# Patient Record
Sex: MALE | HISPANIC OR LATINO | Employment: FULL TIME | URBAN - METROPOLITAN AREA
[De-identification: names, ages, dates, MRNs, and addresses within clinical notes are randomized per-mention and may not be internally consistent; named-entity substitution may affect disease eponyms.]

---

## 2017-01-05 ENCOUNTER — OFFICE VISIT (OUTPATIENT)
Dept: MEDICAL GROUP | Age: 62
End: 2017-01-05
Payer: COMMERCIAL

## 2017-01-05 VITALS
OXYGEN SATURATION: 95 % | WEIGHT: 178 LBS | HEIGHT: 62 IN | TEMPERATURE: 97 F | RESPIRATION RATE: 12 BRPM | BODY MASS INDEX: 32.76 KG/M2 | DIASTOLIC BLOOD PRESSURE: 80 MMHG | SYSTOLIC BLOOD PRESSURE: 140 MMHG | HEART RATE: 82 BPM

## 2017-01-05 DIAGNOSIS — Z00.00 PREVENTATIVE HEALTH CARE: ICD-10-CM

## 2017-01-05 DIAGNOSIS — K42.9 UMBILICAL HERNIA WITHOUT OBSTRUCTION AND WITHOUT GANGRENE: ICD-10-CM

## 2017-01-05 DIAGNOSIS — E08.00 DIABETES MELLITUS DUE TO UNDERLYING CONDITION WITH HYPEROSMOLARITY WITHOUT COMA, WITHOUT LONG-TERM CURRENT USE OF INSULIN (HCC): ICD-10-CM

## 2017-01-05 DIAGNOSIS — E78.00 HYPERCHOLESTEROLEMIA: ICD-10-CM

## 2017-01-05 DIAGNOSIS — I10 ESSENTIAL HYPERTENSION: ICD-10-CM

## 2017-01-05 DIAGNOSIS — E66.9 OBESITY (BMI 30-39.9): ICD-10-CM

## 2017-01-05 PROCEDURE — 99214 OFFICE O/P EST MOD 30 MIN: CPT | Performed by: FAMILY MEDICINE

## 2017-01-05 RX ORDER — ASPIRIN 81 MG/1
81 TABLET, CHEWABLE ORAL DAILY
Qty: 100 TAB | Refills: 0 | Status: SHIPPED | OUTPATIENT
Start: 2017-01-05

## 2017-01-05 RX ORDER — LISINOPRIL 5 MG/1
5 TABLET ORAL DAILY
Qty: 30 TAB | Refills: 0 | Status: SHIPPED | OUTPATIENT
Start: 2017-01-05

## 2017-01-05 RX ORDER — ATORVASTATIN CALCIUM 20 MG/1
20 TABLET, FILM COATED ORAL DAILY
Qty: 30 TAB | Refills: 0 | Status: SHIPPED | OUTPATIENT
Start: 2017-01-05

## 2017-01-05 ASSESSMENT — PATIENT HEALTH QUESTIONNAIRE - PHQ9: CLINICAL INTERPRETATION OF PHQ2 SCORE: 0

## 2017-01-05 NOTE — ASSESSMENT & PLAN NOTE
[place] [facility]   The patient stopped taking his simvastatin as well. Again this is all because his doctor stopped working hearing and return to the clinic.

## 2017-01-05 NOTE — ASSESSMENT & PLAN NOTE
Patient states that this has been present for years but recently has become painfull. He works as a  and has lift heavy equipment in the kitchen. He denies any changes in bowel or bladder function. He is able tolerate by mouth.

## 2017-01-05 NOTE — ASSESSMENT & PLAN NOTE
The patient has a significant history of diabetes mellitus he was taking Januvia 100 as well as glucovance 5/500 bid and tollerated them fine in the past. The patient denies  any polydipsia, polyuria, polyphagia, weight loss, no numbness or tingling anywhere, no changes in vision.    Results for CANDELARIO QUIÑONEZPACHECO IBARRA (MRN [id number]) as of 1/5/2017 13:37   Ref. Range 2/8/2013 09:50 1/23/2014 11:16   Glycohemoglobin Latest Ref Range: 0.0-5.6 % 11.4 9.3 (H)   Estim. Avg Glu Latest Units: mg/dL 280 220

## 2017-01-05 NOTE — ASSESSMENT & PLAN NOTE
The patient is a 61-year-old male who presents to clinic to reestablish care. He has a significant past medical history of diabetes mellitus, hypertension and hyperlipidemia. He stopped taking all of his medications since his doctor stopped working here couple years ago. The patient denied any chest pain, no sob, no hartley, no  pnd, no orthopnea, no headache, no changes in vision, no numbness or tingling, no nausea, no diarrhea, no abdominal pain, no fevers, no chills, no bright red blood per rectum, no  difficulty urinating, no burning during micturition, no depressed mood, no other concerns.

## 2017-01-06 ENCOUNTER — HOSPITAL ENCOUNTER (OUTPATIENT)
Dept: LAB | Facility: MEDICAL CENTER | Age: 62
End: 2017-01-06
Attending: FAMILY MEDICINE
Payer: COMMERCIAL

## 2017-01-06 DIAGNOSIS — E08.00 DIABETES MELLITUS DUE TO UNDERLYING CONDITION WITH HYPEROSMOLARITY WITHOUT COMA, WITHOUT LONG-TERM CURRENT USE OF INSULIN (HCC): ICD-10-CM

## 2017-01-06 LAB
ALBUMIN SERPL BCP-MCNC: 3.9 G/DL (ref 3.2–4.9)
ALBUMIN/GLOB SERPL: 1.1 G/DL
ALP SERPL-CCNC: 90 U/L (ref 30–99)
ALT SERPL-CCNC: 13 U/L (ref 2–50)
ANION GAP SERPL CALC-SCNC: 7 MMOL/L (ref 0–11.9)
AST SERPL-CCNC: 12 U/L (ref 12–45)
BASOPHILS # BLD AUTO: 0.03 K/UL (ref 0–0.12)
BASOPHILS NFR BLD AUTO: 0.4 % (ref 0–1.8)
BILIRUB SERPL-MCNC: 0.6 MG/DL (ref 0.1–1.5)
BUN SERPL-MCNC: 14 MG/DL (ref 8–22)
CALCIUM SERPL-MCNC: 9 MG/DL (ref 8.5–10.5)
CHLORIDE SERPL-SCNC: 102 MMOL/L (ref 96–112)
CHOLEST SERPL-MCNC: 223 MG/DL (ref 100–199)
CO2 SERPL-SCNC: 25 MMOL/L (ref 20–33)
CREAT SERPL-MCNC: 0.58 MG/DL (ref 0.5–1.4)
EOSINOPHIL # BLD: 1.38 K/UL (ref 0–0.51)
EOSINOPHIL NFR BLD AUTO: 20.1 % (ref 0–6.9)
ERYTHROCYTE [DISTWIDTH] IN BLOOD BY AUTOMATED COUNT: 38.8 FL (ref 35.9–50)
EST. AVERAGE GLUCOSE BLD GHB EST-MCNC: 329 MG/DL
GLOBULIN SER CALC-MCNC: 3.5 G/DL (ref 1.9–3.5)
GLUCOSE SERPL-MCNC: 302 MG/DL (ref 65–99)
HBA1C MFR BLD: 13.1 % (ref 0–5.6)
HCT VFR BLD AUTO: 49 % (ref 42–52)
HDLC SERPL-MCNC: 45 MG/DL
HGB BLD-MCNC: 16.9 G/DL (ref 14–18)
IMM GRANULOCYTES # BLD AUTO: 0.01 K/UL (ref 0–0.11)
IMM GRANULOCYTES NFR BLD AUTO: 0.1 % (ref 0–0.9)
LDLC SERPL CALC-MCNC: 154 MG/DL
LYMPHOCYTES # BLD: 1.98 K/UL (ref 1–4.8)
LYMPHOCYTES NFR BLD AUTO: 28.9 % (ref 22–41)
MCH RBC QN AUTO: 30 PG (ref 27–33)
MCHC RBC AUTO-ENTMCNC: 34.5 G/DL (ref 33.7–35.3)
MCV RBC AUTO: 87 FL (ref 81.4–97.8)
MONOCYTES # BLD: 0.39 K/UL (ref 0–0.85)
MONOCYTES NFR BLD AUTO: 5.7 % (ref 0–13.4)
NEUTROPHILS # BLD: 3.07 K/UL (ref 1.82–7.42)
NEUTROPHILS NFR BLD AUTO: 44.8 % (ref 44–72)
NRBC # BLD AUTO: 0 K/UL
NRBC BLD-RTO: 0 /100 WBC
PLATELET # BLD AUTO: 243 K/UL (ref 164–446)
PMV BLD AUTO: 11.6 FL (ref 9–12.9)
POTASSIUM SERPL-SCNC: 4 MMOL/L (ref 3.6–5.5)
PROT SERPL-MCNC: 7.4 G/DL (ref 6–8.2)
RBC # BLD AUTO: 5.63 M/UL (ref 4.7–6.1)
SODIUM SERPL-SCNC: 134 MMOL/L (ref 135–145)
TRIGL SERPL-MCNC: 120 MG/DL (ref 0–149)
WBC # BLD AUTO: 6.9 K/UL (ref 4.8–10.8)

## 2017-01-06 PROCEDURE — 80053 COMPREHEN METABOLIC PANEL: CPT

## 2017-01-06 PROCEDURE — 83036 HEMOGLOBIN GLYCOSYLATED A1C: CPT

## 2017-01-06 PROCEDURE — 85025 COMPLETE CBC W/AUTO DIFF WBC: CPT

## 2017-01-06 PROCEDURE — 36415 COLL VENOUS BLD VENIPUNCTURE: CPT

## 2017-01-06 PROCEDURE — 80061 LIPID PANEL: CPT

## 2017-01-09 ENCOUNTER — TELEPHONE (OUTPATIENT)
Dept: MEDICAL GROUP | Age: 62
End: 2017-01-09

## 2017-01-09 NOTE — TELEPHONE ENCOUNTER
Phone Number Called: [phone] (home)     Message: Left message for pt to call back.    Left Message for patient to call back: yes

## 2017-01-09 NOTE — TELEPHONE ENCOUNTER
----- Message from Codie Bianchi M.D. sent at 1/9/2017 10:09 AM PST -----  Reza Fisher,     Your diabetes needs to be much better controlled, as we discussed.  Remember our plan for medication changes.   See you at our next visit.    Nathanael Bianchi MD  [facility]  [address] [place] [phone]

## 2017-02-09 ENCOUNTER — OFFICE VISIT (OUTPATIENT)
Dept: MEDICAL GROUP | Age: 62
End: 2017-02-09
Payer: COMMERCIAL

## 2017-02-09 ENCOUNTER — TELEPHONE (OUTPATIENT)
Dept: MEDICAL GROUP | Age: 62
End: 2017-02-09

## 2017-02-09 VITALS
HEIGHT: 62 IN | HEART RATE: 78 BPM | BODY MASS INDEX: 32.97 KG/M2 | TEMPERATURE: 97.2 F | OXYGEN SATURATION: 94 % | SYSTOLIC BLOOD PRESSURE: 122 MMHG | DIASTOLIC BLOOD PRESSURE: 80 MMHG | WEIGHT: 179.2 LBS

## 2017-02-09 DIAGNOSIS — E08.21 DIABETES MELLITUS DUE TO UNDERLYING CONDITION WITH DIABETIC NEPHROPATHY, WITHOUT LONG-TERM CURRENT USE OF INSULIN (HCC): ICD-10-CM

## 2017-02-09 DIAGNOSIS — K42.0 UMBILICAL HERNIA WITH OBSTRUCTION, WITHOUT GANGRENE: ICD-10-CM

## 2017-02-09 DIAGNOSIS — Z12.11 SCREENING FOR COLON CANCER: ICD-10-CM

## 2017-02-09 DIAGNOSIS — I10 ESSENTIAL HYPERTENSION: ICD-10-CM

## 2017-02-09 DIAGNOSIS — Z23 NEED FOR VACCINATION: ICD-10-CM

## 2017-02-09 DIAGNOSIS — E78.00 HYPERCHOLESTEROLEMIA: ICD-10-CM

## 2017-02-09 PROCEDURE — 90732 PPSV23 VACC 2 YRS+ SUBQ/IM: CPT | Performed by: FAMILY MEDICINE

## 2017-02-09 PROCEDURE — 90472 IMMUNIZATION ADMIN EACH ADD: CPT | Performed by: FAMILY MEDICINE

## 2017-02-09 PROCEDURE — 99214 OFFICE O/P EST MOD 30 MIN: CPT | Mod: 25 | Performed by: FAMILY MEDICINE

## 2017-02-09 PROCEDURE — 90715 TDAP VACCINE 7 YRS/> IM: CPT | Performed by: FAMILY MEDICINE

## 2017-02-09 PROCEDURE — 90471 IMMUNIZATION ADMIN: CPT | Performed by: FAMILY MEDICINE

## 2017-02-09 RX ORDER — ATORVASTATIN CALCIUM 20 MG/1
20 TABLET, FILM COATED ORAL
Qty: 90 TAB | Refills: 0 | Status: SHIPPED | OUTPATIENT
Start: 2017-02-09

## 2017-02-09 NOTE — ASSESSMENT & PLAN NOTE
Patient states that this has been present for years but recently has become painfull. He works as a  and has lift heavy equipment in the kitchen. He denies any changes in bowel or bladder function. He is able tolerate by mouth. He did receive a referral to surgery on last visit, however he lost the paperwork.

## 2017-02-09 NOTE — MR AVS SNAPSHOT
"        Phillip Mann   2017 10:00 AM   Office Visit   MRN: [id number]    Department:  [address] [place] [address]   Dept Phone:  [phone]    Description:  Male : 1955   Provider:  Codie Bianchi M.D.           Reason for Visit     Hypertension lab review      Allergies as of 2017     No Known Allergies      You were diagnosed with     Diabetes mellitus due to underlying condition with diabetic nephropathy, without long-term current use of insulin (CMS-HCC)   [5156759]       Hypercholesterolemia   [982368]       Essential hypertension   [1867614]       Umbilical hernia with obstruction, without gangrene   [243567]       Need for vaccination   [024324]       Screening for colon cancer   [483963]         Vital Signs     Blood Pressure Pulse Temperature Height Weight Body Mass Index    122/80 mmHg 78 36.2 °C (97.2 °F) 1.575 m (5' 2.01\") 81.285 kg (179 lb 3.2 oz) 32.77 kg/m2    Oxygen Saturation Smoking Status                94% Never Smoker           Basic Information     Date Of Birth Sex Race Ethnicity Preferred Language    1955 Male Unknown  Origin (English,Angolan,Indian,Blaine, etc) English      Your appointments     2017  9:00 AM   Established Patient with Codie Bianchi M.D.   [facility] [address] [place] [id number] [phone]           You will be receiving a confirmation call a few days before your appointment from our automated call confirmation system.              Problem List              ICD-10-CM Priority Class Noted - Resolved    DM (diabetes mellitus) (CMS-HCC) E11.9   2014 - Present    Hypercholesterolemia E78.00   2014 - Present    Essential hypertension I10   2017 - Present    Umbilical hernia K42.9   2017 - Present    Preventative health care Z00.00   2017 - Present    Obesity (BMI 30-39.9) E66.9   2017 - Present    Need for vaccination Z23   2017 - " Present    Screening for colon cancer Z12.11   2/9/2017 - Present      Health Maintenance        Date Due Completion Dates    DIABETES MONOFILAMENT / LE EXAM [birth year]/29/1955 ---    IMM DTaP/Tdap/Td Vaccine (1 - Tdap) 12/29/1973 ---    IMM PNEUMOCOCCAL 19-64 (ADULT) MEDIUM RISK SERIES (1 of 1 - PPSV23) 12/29/1973 ---    COLONOSCOPY 12/29/2004 ---    IMM ZOSTER VACCINE 12/29/2014 ---    URINE ACR / MICROALBUMIN 1/23/2015 1/23/2014, 2/8/2013, 1/13/2012    IMM INFLUENZA (1) 9/1/2016 ---    RETINAL SCREENING 5/3/2017 5/3/2016    A1C SCREENING 7/6/2017 1/6/2017, 1/23/2014, 2/8/2013, 4/6/2012    FASTING LIPID PROFILE 1/6/2018 1/6/2017, 1/23/2014, 2/8/2013, 4/6/2012, 1/13/2012    SERUM CREATININE 1/6/2018 1/6/2017, 1/23/2014, 2/8/2013, 1/13/2012            Current Immunizations     Pneumococcal polysaccharide vaccine (PPSV-23) 2/9/2017    Tdap Vaccine 2/9/2017      Below and/or attached are the medications your provider expects you to take. Review all of your home medications and newly ordered medications with your provider and/or pharmacist. Follow medication instructions as directed by your provider and/or pharmacist. Please keep your medication list with you and share with your provider. Update the information when medications are discontinued, doses are changed, or new medications (including over-the-counter products) are added; and carry medication information at all times in the event of emergency situations     Allergies:  No Known Allergies          Medications  Valid as of: February 09, 2017 - 10:45 AM    Generic Name Brand Name Tablet Size Instructions for use    Aspirin (Chew Tab) ASA 81 MG Take 1 Tab by mouth every day.        Atorvastatin Calcium (Tab) LIPITOR 20 MG Take 1 Tab by mouth every day.        Atorvastatin Calcium (Tab) LIPITOR 20 MG Take 1 Tab by mouth every bedtime.        GlyBURIDE-MetFORMIN (Tab) GLUCOVANCE 5-500 MG TAKE ONE TABLET BY MOUTH TWICE A DAY WITH MEALS        Lisinopril (Tab) PRINIVIL 5 MG  Take 1 Tab by mouth every day.        MetFORMIN HCl (Tab) GLUCOPHAGE 500 MG Take 1 Tab by mouth 2 times a day, with meals.        Misc. Devices (Misc) Misc. Devices  Please provide patient with shingles vaccine        Simvastatin (Tab) ZOCOR 20 MG TAKE ONE TABLET BY MOUTH EVERY EVENING        SITagliptin Phosphate (Tab) JANUVIA 100 MG Take 1 Tab by mouth every day.        SITagliptin-MetFORMIN HCl (Tab) JANUMET  MG Take 1 Tab by mouth 2 times a day, with meals.        Tadalafil (Tab) CIALIS 20 MG Take 1 Tab by mouth as needed for Erectile Dysfunction.        .                 Medicines prescribed today were sent to:     [facility][id number] - [place] - [address]    [address] [place] [address]    Phone: [phone] Fax: [phone]    Open 24 Hours?: No      Medication refill instructions:       If your prescription bottle indicates you have medication refills left, it is not necessary to call your provider’s office. Please contact your pharmacy and they will refill your medication.    If your prescription bottle indicates you do not have any refills left, you may request refills at any time through one of the following ways: The online [employer] car easily beat system (except Urgent Care), by calling your provider’s office, or by asking your pharmacy to contact your provider’s office with a refill request. Medication refills are processed only during regular business hours and may not be available until the next business day. Your provider may request additional information or to have a follow-up visit with you prior to refilling your medication.   *Please Note: Medication refills are assigned a new Rx number when refilled electronically. Your pharmacy may indicate that no refills were authorized even though a new prescription for the same medication is available at the pharmacy. Please request the medicine by name with the pharmacy before contacting your provider for a refill.        Referral     A referral request  has been sent to our patient care coordination department. Please allow 3-5 business days for us to process this request and contact you either by phone or mail. If you do not hear from us by the 5th business day, please call us at [phone].        Instructions    Vacuna contra la culebrilla, Lo que debe saber  (Shingles Vaccine: What You Need to Know)  ¿QUÉ ES LA CULEBRILLA?  · Es javy erupción dolorosa de la piel, en la que aparecen ampollas. Esta enfermedad también se denomina herpes zoster o zoster.  · Se trata de javy erupción que aparece en un lado del roman o del cuerpo y dura entre 2 y 4 semanas. El síntoma principal es el dolor, que puede ser bastante intenso. Otros síntomas son fiebre, cefalea, escalofríos y malestar en el estómago. En casos raros, esta infección puede causar neumonía, problemas auditivos, ceguera, inflamación cerebral (encefalitis) o la muerte.  · En 1 de cada 5 personas, el dolor intenso puede persistir aún después que la erupción desaparezca. Molly dolor se denomina neurlagia post herpética.  · La causa de la culebrilla es el virus varicela-zoster. Mloly es el mismo virus que causa la varicela. Sólo las personas que oliver sufrido varicela o que oliver recibido la vacuna contra la varicela pueden tener culebrilla. El virus permanece en el organismo. Puede volver a aparecer muchos años después para causar culebrilla.  · La culebrilla no se contagia. Sin embargo, javy persona que nunca sufrió varicela (ni recibió la vacuna) podría contraer varicela contagiándose de alguien que padece culebrilla. [place] no es frecuente.  · La culebrilla es más frecuente entre las personas mayores de 50 años que entre los más jóvenes. También es más frecuente en personas cuyo sistema inmunológico está debilitado debido a javy enfermedad reanna el cáncer o medicamentos reanna los corticoides o las drogas utilizadas en quimioterapia.  · Al menos 1 millón de personas contrae culebrilla cada año en los [place].  VACUNA CONTRA LA CULEBRILLA  · Esta vacuna fue patentada en 2006. En los ensayos clínicos, se demostró que la vacuna prevenía la culebrilla en alrededor de la mitad de las personas. También reduce el dolor asociado a cornelio trastorno.  · Se indica javy única dosis de vacuna en adultos de más de 60 años.  ALGUNAS PERSONAS NO DEBEN RECIBIR LA VACUNA O DEBEN ESPERAR  No deben vacunarse las personas que:  · Alguna vez hayan sufrido javy reacción alérgica a la gelatina, al antibiótico neomicina o a cualquier otro componente de la vacuna. Si observa javy reacción alérgica grave, comuníquese con el médico.  · Corrales sistema inmunológico está debilitado debido a:  ¨ SIDA u otra enfermedad que afecte el sistema inmunológico.  ¨ Tratamientos con drogas que afecten el sistema inmunológico gurpreet los corticoides.  ¨ Tratamientos para el cáncer gurpreet la radiación o la quimioterapia.  ¨ Tienen javy historia de cáncer que haya afectado la médula ósea o el sistema linfático, gurpreet leucemia o linfoma.  ¨ Están embarazadas o mara estarlo. Las que deseen quedar embarazadas no deben hacerlo hasta al menos 4 semanas luego de recibir esta vacuna.  Las personas con enfermedades menores gurpreet un resfrío, pueden vacunarse. Las personas que sufren enfermedades moderadas o graves deben esperar hasta corrales recuperación antes de recibir la vacuna. Aquí se incluye a quienes presenten javy temperatura de 101.3° F (38.5° C).  ¿CUÁLES SON LOS RIESGOS DE LA VACUNA CONTRA LA CULEBRILLA?  · Javy vacuna, gurpreet cualquier medicamento, puede causar problemas serios, gurpreet por ejemplo reacciones alérgicas graves. Sin embargo, el riesgo de que cualquier vacuna cause un daño grave, o la muerte, es extremadamente pequeño.  · No se oliver asociado reacciones de importancia a estas vacunas.  Problemas leves  · Enrojecimiento, dolor, hinchazón, o picazón en el lugar de la inyección (en 1 de cada 3 personas).  · Cefalea (en aproximadamente 1 de cada 70 personas).  Gurpreet todas  las vacunas, esta vacuna sigue siendo controlada para hallar problemas infrecuentes o graves.  ¿QUÉ KATIE HACER EN MAXIME DE JAVY REACCIÓN MODERADA O GRAVE?  ¿Qué katie observar?  Cualquier estado anormal reanna javy reacción alérgica grave o fiebre chris. Si ocurre javy reacción alérgica grave, ocurrirá entre unos pocos minutos hasta javy hora después de la aplicación de la inyección. Signos de reacción alérgica grave que presente dificultad para respirar, debilidad, ronquera o silbidos, ritmo cardíaco acelerado, ronchas, mareos, palidez, o hinchazón de la garganta.   ¿Qué katie hacer?  · Comuníquese con el profesional que lo asiste o lleve inmediatamente a la persona al médico.  · Cuéntele al médico lo que ha sucedido, la fecha y momento en el que ocurrió y cuándo se aplicó la vacuna.  · [place]ídale a corrales médico, enfermera o el departamento de suha que informen la reacción llenando el formulario de Vaccine Adverse Event Report (Informe de reacciones adversas a las vacunas - VAERS). O, puede enviar cornelio informe a través de la página web del [facility] al http://www.[place].hhs.gov o llamando al 5-648-249-4188.  El VAERS no proporciona orientación médica.  ¿CÓMO PUEDO SABER MÁS?  · El profesional podrá darle el prospecto de la vacuna o sugerirle otras vazquez de información.  · Comuníquese con los Centers for Disease Control and Prevention (Centros para el control y la prevención de enfermedades, CDC).  ¨ Llame al 8-981-971-8409 (2-180-KWX-INFO).  ¨ Visite los sitios web de CDC ubicados enhttp://www.cdc.gov/vaccines.  CDC Shingles Vaccine-Vietnamese VIS (10/6/09)     Esta información no tiene reanna fin reemplazar el consejo del médico. Asegúrese de hacerle al médico cualquier pregunta que tenga.     Document Released: 06/05/2009 Document Revised: 05/03/2016  Elsevier Interactive Patient Education ©2016 Elsevier Inc.            MyChart Status: Patient Declined

## 2017-02-09 NOTE — PATIENT INSTRUCTIONS
Vacuna contra la culebrilla, Lo que debe saber  (Shingles Vaccine: What You Need to Know)  ¿QUÉ ES LA CULEBRILLA?  · Es javy erupción dolorosa de la piel, en la que aparecen ampollas. Esta enfermedad también se denomina herpes zoster o zoster.  · Se trata de javy erupción que aparece en un lado del roman o del cuerpo y dura entre 2 y 4 semanas. El síntoma principal es el dolor, que puede ser bastante intenso. Otros síntomas son fiebre, cefalea, escalofríos y malestar en el estómago. En casos raros, esta infección puede causar neumonía, problemas auditivos, ceguera, inflamación cerebral (encefalitis) o la muerte.  · En 1 de cada 5 personas, el dolor intenso puede persistir aún después que la erupción desaparezca. [place] dolor se denomina neurlagia post herpética.  · La causa de la culebrilla es el virus varicela-zoster. Cornelio es el mismo virus que causa la varicela. Sólo las personas que oliver sufrido varicela o que oliver recibido la vacuna contra la varicela pueden tener culebrilla. El virus permanece en el organismo. Puede volver a aparecer muchos años después para causar culebrilla.  · La culebrilla no se contagia. Sin embargo, javy persona que nunca sufrió varicela (ni recibió la vacuna) podría contraer varicela contagiándose de alguien que padece culebrilla. [place] no es frecuente.  · La culebrilla es más frecuente entre las personas mayores de 50 años que entre los más jóvenes. También es más frecuente en personas cuyo sistema inmunológico está debilitado debido a javy enfermedad reanna el cáncer o medicamentos reanna los corticoides o las drogas utilizadas en quimioterapia.  · Al menos 1 millón de personas contrae culebrilla cada año en los [place].  VACUNA CONTRA LA CULEBRILLA  · Esta vacuna fue patentada en 2006. En los ensayos clínicos, se demostró que la vacuna prevenía la culebrilla en alrededor de la mitad de las personas. También reduce el dolor asociado a cornelio trastorno.  · Se indica javy única dosis de vacuna  en adultos de más de 60 años.  ALGUNAS PERSONAS NO DEBEN RECIBIR LA VACUNA O DEBEN ESPERAR  No deben vacunarse las personas que:  · Alguna vez hayan sufrido javy reacción alérgica a la gelatina, al antibiótico neomicina o a cualquier otro componente de la vacuna. Si observa javy reacción alérgica grave, comuníquese con el médico.  · Corrales sistema inmunológico está debilitado debido a:  ¨ SIDA u otra enfermedad que afecte el sistema inmunológico.  ¨ Tratamientos con drogas que afecten el sistema inmunológico gurpreet los corticoides.  ¨ Tratamientos para el cáncer gurpreet la radiación o la quimioterapia.  ¨ Tienen javy historia de cáncer que haya afectado la médula ósea o el sistema linfático, gurpreet leucemia o linfoma.  ¨ Están embarazadas o mara estarlo. Las que deseen quedar embarazadas no deben hacerlo hasta al menos 4 semanas luego de recibir esta vacuna.  Las personas con enfermedades menores gurpreet un resfrío, pueden vacunarse. Las personas que sufren enfermedades moderadas o graves deben esperar hasta corrales recuperación antes de recibir la vacuna. Aquí se incluye a quienes presenten javy temperatura de 101.3° F (38.5° C).  ¿CUÁLES SON LOS RIESGOS DE LA VACUNA CONTRA LA CULEBRILLA?  · Javy vacuna, gurpreet cualquier medicamento, puede causar problemas serios, gurpreet por ejemplo reacciones alérgicas graves. Sin embargo, el riesgo de que cualquier vacuna cause un daño grave, o la muerte, es extremadamente pequeño.  · No se oliver asociado reacciones de importancia a estas vacunas.  Problemas leves  · Enrojecimiento, dolor, hinchazón, o picazón en el lugar de la inyección (en 1 de cada 3 personas).  · Cefalea (en aproximadamente 1 de cada 70 personas).  Gurpreet todas las vacunas, esta vacuna sigue siendo controlada para hallar problemas infrecuentes o graves.  ¿QUÉ KATIE HACER EN MAXIME DE JAVY REACCIÓN MODERADA O GRAVE?  ¿Qué katie observar?  Cualquier estado anormal gurpreet javy reacción alérgica grave o fiebre chris. Si ocurre javy reacción alérgica  grave, ocurrirá entre unos pocos minutos hasta javy hora después de la aplicación de la inyección. Signos de reacción alérgica grave que presente dificultad para respirar, debilidad, ronquera o silbidos, ritmo cardíaco acelerado, ronchas, mareos, palidez, o hinchazón de la garganta.   ¿Qué kika hacer?  · Comuníquese con el profesional que lo asiste o lleve inmediatamente a la persona al médico.  · Cuéntele al médico lo que ha sucedido, la fecha y momento en el que ocurrió y cuándo se aplicó la vacuna.  · Pídale a corrales médico, enfermera o el departamento de suha que informen la reacción llenando el formulario de Vaccine Adverse Event Report (Informe de reacciones adversas a las vacunas - VAERS). O, puede enviar cornelio informe a través de la página web del [facility] al http://www.[employer].[place].gov o llamando al 1-812-293-1726.  El VAERS no proporciona orientación médica.  ¿CÓMO PUEDO SABER MÁS?  · El profesional podrá darle el prospecto de la vacuna o sugerirle otras vazquez de información.  · Comuníquese con los Centers for Disease Control and Prevention (Centros para el control y la prevención de enfermedades, CDC).  ¨ Llame al 0-679-632-2463 (0-163-TCP-INFO).  ¨ Visite los sitios web de CDC ubicados enhttp://www.cdc.gov/vaccines.  CDC Shingles Vaccine-[place] VIS (10/6/09)     Esta información no tiene reanna fin reemplazar el consejo del médico. Asegúrese de hacerle al médico cualquier pregunta que tenga.     Document Released: 06/05/2009 Document Revised: 05/03/2016  Elsevier Interactive Patient Education ©2016 Elsevier Inc.

## 2017-02-09 NOTE — ASSESSMENT & PLAN NOTE
We restarted patient on atorvastatin 20 mg by mouth daily at bedtime. He has been tolerating it just fine. He denies any muscle aches, no abdominal pain, no history of elevated liver enzymes.

## 2017-02-09 NOTE — ASSESSMENT & PLAN NOTE
The patient has been off of his medications for over approximately 2 years because his physician retired. On the first visit we started him on metformin 500 mg by mouth daily, he tolerated this just fine. He denied any polyuria, no polydipsia, no polyphagia, no weight loss, no numbness or tingling anywhere, no change in vision.    The patient is on an ACE inhibitor, aspirin and a statin, also checks feet meticulously daily for ulcerations.    Retinal screening was done last year, next due on 5/3/2017  microalbumin checked due now  Up to date on vaccinations NO

## 2017-02-09 NOTE — TELEPHONE ENCOUNTER
DOCUMENTATION OF PAR STATUS:    1. Name of Medication & Dose: Janumet  mg tab     2. Name of Prescription Coverage Company & phone #: [facility]     3. Date Prior Auth Submitted: 2/9/17    4. What information was given to obtain insurance decision? Diagnosis, medications tried and failed, office visit notes and recent labs    5. Prior Auth Status? Pending    6. Patient Notified: no

## 2017-02-09 NOTE — PROGRESS NOTES
This medical record contains text that has been entered with the assistance of computer voice recognition and dictation software.  Therefore, it may contain unintended errors in text, spelling, punctuation, or grammar    Chief Complaint   Patient presents with   • Hypertension     lab review       Phillip Mann is a 61 y.o. male here evaluation and management of: diabetes mellitus, HTN, HLD, umbilical hernia      HPI:     DM (diabetes mellitus)  The patient has been off of his medications for over approximately 2 years because his physician retired. On the first visit we started him on metformin 500 mg by mouth daily, he tolerated this just fine. He denied any polyuria, no polydipsia, no polyphagia, no weight loss, no numbness or tingling anywhere, no change in vision.    The patient is on an ACE inhibitor, aspirin and a statin, also checks feet meticulously daily for ulcerations.    Retinal screening was done last year, next due on 5/3/2017  microalbumin checked due now  Up to date on vaccinations NO    Essential hypertension  We started him on lisinopril 5 mg by mouth daily almost visit. Blood pressure is better controlled based on vitals today.The patient is  on a Baby ASPIRIN and a  STATIN.  The patient has been tollerating the BP meds without any issues.He is tolerating his medication well, he denies any lightheadedness, no tunnel vision, no syncopal episodes, no fatigue, no leg weakness no falls.    Hypercholesterolemia  We restarted patient on atorvastatin 20 mg by mouth daily at bedtime. He has been tolerating it just fine. He denies any muscle aches, no abdominal pain, no history of elevated liver enzymes.    Umbilical hernia  Patient states that this has been present for years but recently has become painfull. He works as a  and has lift heavy equipment in the kitchen. He denies any changes in bowel or bladder function. He is able tolerate by mouth. He did receive a referral to surgery on last  "visit, however he lost the paperwork.      Current medicines (including changes today)  Current Outpatient Prescriptions   Medication Sig Dispense Refill   • sitagliptan-metformin (JANUMET)  MG per tablet Take 1 Tab by mouth 2 times a day, with meals. 180 Tab 0   • atorvastatin (LIPITOR) 20 MG Tab Take 1 Tab by mouth every bedtime. 90 Tab 0   • Misc. Devices Misc Please provide patient with shingles vaccine 1 Application 0   • metformin (GLUCOPHAGE) 500 MG Tab Take 1 Tab by mouth 2 times a day, with meals. 60 Tab 0   • lisinopril (PRINIVIL) 5 MG Tab Take 1 Tab by mouth every day. 30 Tab 0   • atorvastatin (LIPITOR) 20 MG Tab Take 1 Tab by mouth every day. 30 Tab 0   • simvastatin (ZOCOR) 20 MG TABS TAKE ONE TABLET BY MOUTH EVERY EVENING 30 Tab 0   • glyburide-metformin (GLUCOVANCE) 5-500 MG TABS TAKE ONE TABLET BY MOUTH TWICE A DAY WITH MEALS 60 Tab 0   • aspirin (ASA) 81 MG Chew Tab chewable tablet Take 1 Tab by mouth every day. (Patient not taking: Reported on 2/9/2017) 100 Tab 0   • sitagliptin (JANUVIA) 100 MG TABS Take 1 Tab by mouth every day. (Patient not taking: Reported on 1/5/2017) 30 Tab 11   • tadalafil (CIALIS) 20 MG tablet Take 1 Tab by mouth as needed for Erectile Dysfunction. (Patient not taking: Reported on 1/5/2017) 5 Tab 6     No current facility-administered medications for this visit.     He  has no past medical history on file.  He  has no past surgical history on file.  Social History   Substance Use Topics   • Smoking status: Never Smoker    • Smokeless tobacco: Never Used   • Alcohol Use: No     Social History     Social History Narrative     History reviewed. No pertinent family history.  No family status information on file.         ROS  Please see hpi    All other systems reviewed and are negative     Objective:     Blood pressure 122/80, pulse 78, temperature 36.2 °C (97.2 °F), height 1.575 m (5' 2.01\"), weight 81.285 kg (179 lb 3.2 oz), SpO2 94 %. Body mass index is 32.77 " kg/(m^2).  Physical Exam:    Constitutional: Alert, no distress.  Skin: Warm, dry, good turgor, no rashes in visible areas.  Eye: Equal, round and reactive, conjunctiva clear, lids normal.  ENMT: Lips without lesions, good dentition, oropharynx clear.  Neck: Trachea midline, no masses, no thyromegaly. No cervical or supraclavicular lymphadenopathy.  Respiratory: Unlabored respiratory effort, lungs clear to auscultation, no wheezes, no ronchi.  Cardiovascular: Normal S1, S2, no murmur, no edema.  Abdomen: Abdomen: Soft,  Ventral hernia noted below umbilicus 2cm, TTT, easily reduced  Psych: Alert and oriented x3, normal affect and mood.      Assessment and Plan:   The following treatment plan was discussed, again this medical record contains text that has been entered with the assistance of computer voice recognition and dictation software.  Therefore, it may contain unintended errors in text, spelling, punctuation, or grammar      1. Diabetes mellitus due to underlying condition with diabetic nephropathy, without long-term current use of insulin (HCC)  Increase Janumet to 50/1000 twice a day  Continue lisinopril, continue baby aspirin, continue Lipitor    The patient was counseled on the following  1. meticulous bilateral feet inspection daily  2. yearly dilated eye exams,   3. Wt. loss of 5% will decrease insulin resistance follow a low-fat diet and to begin an exercise program  4.  Pt. is on baby aspirin  5. Patient  is on an ace inhibitor will check microalbumin yearly  6. BP goal is ,130/80, and LDL goal is <70  7. Hba1c goal is less than 7  8. Recommend cmp and A1C evaluation every 3 months  9. Check blood sugar twice daily  and minimum once daily at various times  10. Also needs diabetic education  11. Recommend vaccinations: Yearly Flu, Pneumvox, and hepatitis B vaccine  12. Yearly dental exam  13. Patient is on a Statin  - sitagliptan-metformin (JANUMET)  MG per tablet; Take 1 Tab by mouth 2 times a  day, with meals.  Dispense: 180 Tab; Refill: 0    2. Hypercholesterolemia  No adverse reactions thus far    - atorvastatin (LIPITOR) 20 MG Tab; Take 1 Tab by mouth every bedtime.  Dispense: 90 Tab; Refill: 0    3. Essential hypertension  Blood pressure is better controlled and at goal since starting lisinopril  He has not had any adverse events, no intolerable side effects    4. Umbilical hernia with obstruction, without gangrene  Since it is painful we will  Ask for surgical correction  - REFERRAL TO GENERAL SURGERY    5. Need for vaccination  Given today    - PNEUMOCOCCAL POLYSACCHARIDE VACCINE 23-VALENT =>1YO SQ/IM  - TDAP VACCINE =>8YO IM  - Misc. Devices Misc; Please provide patient with shingles vaccine  Dispense: 1 Application; Refill: 0    6. Screening for colon cancer  Never had a colonoscopy!!    - REFERRAL TO GASTROENTEROLOGY        Followup: Return in about 2 years (around 2/9/2019) for labs, With Me.

## 2017-02-13 NOTE — TELEPHONE ENCOUNTER
FINAL PRIOR AUTHORIZATION STATUS:    1.  Name of Medication & Dose: Janumet  mg tab      2. Prior Auth Status: Approved through 02/13/2017 - 20/08/2099     3. Action Taken: Pharmacy Notified: yes Patient Notified: yes

## 2017-04-20 ENCOUNTER — OFFICE VISIT (OUTPATIENT)
Dept: MEDICAL GROUP | Age: 62
End: 2017-04-20
Payer: COMMERCIAL

## 2017-04-20 VITALS
DIASTOLIC BLOOD PRESSURE: 70 MMHG | HEIGHT: 62 IN | WEIGHT: 183.6 LBS | OXYGEN SATURATION: 96 % | SYSTOLIC BLOOD PRESSURE: 122 MMHG | TEMPERATURE: 97.2 F | BODY MASS INDEX: 33.79 KG/M2 | HEART RATE: 76 BPM

## 2017-04-20 DIAGNOSIS — E78.00 HYPERCHOLESTEROLEMIA: ICD-10-CM

## 2017-04-20 DIAGNOSIS — I10 ESSENTIAL HYPERTENSION: ICD-10-CM

## 2017-04-20 DIAGNOSIS — E08.00 DIABETES MELLITUS DUE TO UNDERLYING CONDITION WITH HYPEROSMOLARITY WITHOUT COMA, WITHOUT LONG-TERM CURRENT USE OF INSULIN (HCC): ICD-10-CM

## 2017-04-20 DIAGNOSIS — Z12.11 SCREENING FOR COLON CANCER: ICD-10-CM

## 2017-04-20 PROCEDURE — 99214 OFFICE O/P EST MOD 30 MIN: CPT | Performed by: FAMILY MEDICINE

## 2017-04-20 RX ORDER — ASPIRIN 81 MG/1
81 TABLET, CHEWABLE ORAL DAILY
Qty: 300 TAB | Refills: 0 | Status: SHIPPED | OUTPATIENT
Start: 2017-04-20

## 2017-04-20 NOTE — ASSESSMENT & PLAN NOTE
We did start patient on atorvastatin 20 mg daily at bedtime. Again he is not certain which medications he is taking, he was instructed to bring all medications here next visit.

## 2017-04-20 NOTE — ASSESSMENT & PLAN NOTE
We titrated patient up to Janumet 50/1000 twice a day. He believes he is taking the medicine, he is not certain. He denies any polyuria, no polydipsia, no polyphagia, no weight loss, no numbness or tingling anywhere, no changes in vision.    He denied any polyuria, no polydipsia, no polyphagia, no weight loss, no numbness or tingling anywhere, no change in vision.    The patient is on an ACE inhibitor, aspirin and a statin, also checks feet meticulously daily for ulcerations.    Retinal screening is up to date  microalbumin due for repeat  Up to date on vaccinations not shingles

## 2017-04-20 NOTE — PROGRESS NOTES
This medical record contains text that has been entered with the assistance of computer voice recognition and dictation software.  Therefore, it may contain unintended errors in text, spelling, punctuation, or grammar    No chief complaint on file.      Phillip Mann is a 61 y.o. male here evaluation and management of: Diabetes mellitus, hypertension, hyperlipidemia      HPI:     DM (diabetes mellitus)  We titrated patient up to Janumet 50/1000 twice a day. He believes he is taking the medicine, he is not certain. He denies any polyuria, no polydipsia, no polyphagia, no weight loss, no numbness or tingling anywhere, no changes in vision.    He denied any polyuria, no polydipsia, no polyphagia, no weight loss, no numbness or tingling anywhere, no change in vision.    The patient is on an ACE inhibitor, aspirin and a statin, also checks feet meticulously daily for ulcerations.    Retinal screening is up to date  microalbumin due for repeat  Up to date on vaccinations not shingles    Essential hypertension  We did start patient on lisinopril 5 mg given history of diabetes. But he is not certain which medications he is taking. I asked him to bring all of his medications to clinic next month.    Hypercholesterolemia  We did start patient on atorvastatin 20 mg daily at bedtime. Again he is not certain which medications he is taking, he was instructed to bring all medications here next visit.    Screening for colon cancer  Patient is due for repeat colonoscopy in 2027      Current medicines (including changes today)  Current Outpatient Prescriptions   Medication Sig Dispense Refill   • aspirin (ASA) 81 MG Chew Tab chewable tablet Take 1 Tab by mouth every day. 300 Tab 0   • sitagliptan-metformin (JANUMET)  MG per tablet Take 1 Tab by mouth 2 times a day, with meals. 180 Tab 0   • atorvastatin (LIPITOR) 20 MG Tab Take 1 Tab by mouth every bedtime. 90 Tab 0   • Misc. Devices Misc Please provide patient with  "shingles vaccine 1 Application 0   • metformin (GLUCOPHAGE) 500 MG Tab Take 1 Tab by mouth 2 times a day, with meals. 60 Tab 0   • lisinopril (PRINIVIL) 5 MG Tab Take 1 Tab by mouth every day. 30 Tab 0   • atorvastatin (LIPITOR) 20 MG Tab Take 1 Tab by mouth every day. 30 Tab 0   • aspirin (ASA) 81 MG Chew Tab chewable tablet Take 1 Tab by mouth every day. (Patient not taking: Reported on 4/20/2017) 100 Tab 0   • simvastatin (ZOCOR) 20 MG TABS TAKE ONE TABLET BY MOUTH EVERY EVENING 30 Tab 0   • glyburide-metformin (GLUCOVANCE) 5-500 MG TABS TAKE ONE TABLET BY MOUTH TWICE A DAY WITH MEALS 60 Tab 0   • sitagliptin (JANUVIA) 100 MG TABS Take 1 Tab by mouth every day. 30 Tab 11   • tadalafil (CIALIS) 20 MG tablet Take 1 Tab by mouth as needed for Erectile Dysfunction. 5 Tab 6     No current facility-administered medications for this visit.     He  has no past medical history on file.  He  has no past surgical history on file.  Social History   Substance Use Topics   • Smoking status: Never Smoker    • Smokeless tobacco: Never Used   • Alcohol Use: No     Social History     Social History Narrative     History reviewed. No pertinent family history.  No family status information on file.         ROS  Please see history of present illness    All other systems reviewed and are negative     Objective:     Blood pressure 122/70, pulse 76, temperature 36.2 °C (97.2 °F), height 1.575 m (5' 2.01\"), weight 83.28 kg (183 lb 9.6 oz), SpO2 96 %. Body mass index is 33.57 kg/(m^2).  Physical Exam:    Constitutional: Alert, no distress.  Skin: Warm, dry, good turgor, no rashes in visible areas.  Eye: Equal, round and reactive, conjunctiva clear, lids normal.  ENMT: Lips without lesions, good dentition, oropharynx clear.  Neck: Trachea midline, no masses, no thyromegaly. No cervical or supraclavicular lymphadenopathy.  Respiratory: Unlabored respiratory effort, lungs clear to auscultation, no wheezes, no ronchi.  Cardiovascular: Normal " S1, S2, no murmur, no edema.  Abdomen: Soft, non-tender, no masses, no hepatosplenomegaly.  Psych: Alert and oriented x3, normal affect and mood.  Monofilament testing with a 10 gram force: sensation intact: intact bilaterally  Visual Inspection: Feet without maceration, ulcers, fissures.  Pedal pulses: intact bilaterally          Assessment and Plan:   The following treatment plan was discussed, again this medical record contains text that has been entered with the assistance of computer voice recognition and dictation software.  Therefore, it may contain unintended errors in text, spelling, punctuation, or grammar      1. Diabetes mellitus due to underlying condition with hyperosmolarity without coma, without long-term current use of insulin (HCC)  He is not certain which medications he is taking  Stating that he takes a red one in the morning and a white one at night  RTC in 4wks with all meds in hand  Also to see DM RN at that visit    The patient was counseled on the following  1. meticulous bilateral feet inspection daily  2. yearly dilated eye exams,   3. Wt. loss of 5% will decrease insulin resistance follow a low-fat diet and to begin an exercise program  4.  Pt. is on baby aspirin  5. Patient  is on an ace inhibitor will check microalbumin yearly  6. BP goal is ,130/80, and LDL goal is <70  7. Hba1c goal is less than 7  8. Recommend cmp and A1C evaluation every 3 months  9. Check blood sugar twice daily  and minimum once daily at various times  10. Also needs diabetic education  11. Recommend vaccinations: Yearly Flu, Pneumvox, and hepatitis B vaccine  12. Yearly dental exam  13. Patient is on a Statin  - MICROALBUMIN CREAT RATIO URINE; Future  - HEMOGLOBIN A1C; Future  - aspirin (ASA) 81 MG Chew Tab chewable tablet; Take 1 Tab by mouth every day.  Dispense: 300 Tab; Refill: 0    2. Hypercholesterolemia  Need repeat labs in one month    - LIPID PROFILE; Future  - COMP METABOLIC PANEL; Future    3.  Essential hypertension  Patient has been stable with current management  We will make no changes for now      4. Screening for colon cancer  Up to date        Followup: Return in about 4 weeks (around 5/18/2017) for Reevaluation, DM-RN APPOINTMENT.

## 2017-04-20 NOTE — MR AVS SNAPSHOT
"        Phillip Mann   2017 9:00 AM   Office Visit   MRN: [id number]    Department:  [address] [place] [address]   Dept Phone:  [phone]    Description:  Male : 1955   Provider:  Codie Bianchi M.D.           Allergies as of 2017     No Known Allergies      You were diagnosed with     Diabetes mellitus due to underlying condition with hyperosmolarity without coma, without long-term current use of insulin (CMS-HCC)   [3677743]       Hypercholesterolemia   [769320]       Essential hypertension   [4095960]       Screening for colon cancer   [652066]         Vital Signs     Blood Pressure Pulse Temperature Height Weight Body Mass Index    122/70 mmHg 76 36.2 °C (97.2 °F) 1.575 m (5' 2.01\") 83.28 kg (183 lb 9.6 oz) 33.57 kg/m2    Oxygen Saturation Smoking Status                96% Never Smoker           Basic Information     Date Of Birth Sex Race Ethnicity Preferred Language    1955 Male Unknown  Origin (Hungarian,Serbian,Somali,South African, etc) English      Your appointments     May 22, 2017  3:00 PM   Diabetes Care Visit with Codie Bianchi M.D., BLOCK DIABETES RN   [facility] [address] [place] [id number] [phone]           You will be receiving a confirmation call a few days before your appointment from our automated call confirmation system.              Problem List              ICD-10-CM Priority Class Noted - Resolved    DM (diabetes mellitus) (CMS-HCC) E11.9   2014 - Present    Hypercholesterolemia E78.00   2014 - Present    Essential hypertension I10   2017 - Present    Umbilical hernia K42.9   2017 - Present    Preventative health care Z00.00   2017 - Present    Obesity (BMI 30-39.9) E66.9   2017 - Present    Need for vaccination Z23   2017 - Present    Screening for colon cancer Z12.11   2017 - Present      Health Maintenance        Date Due Completion Dates    IMM " ZOSTER VACCINE 12/29/2014 ---    URINE ACR / MICROALBUMIN 1/23/2015 1/23/2014, 2/8/2013, 1/13/2012    RETINAL SCREENING 5/3/2017 5/3/2016    A1C SCREENING 7/6/2017 1/6/2017, 1/23/2014, 2/8/2013, 4/6/2012    FASTING LIPID PROFILE 1/6/2018 1/6/2017, 1/23/2014, 2/8/2013, 4/6/2012, 1/13/2012    SERUM CREATININE 1/6/2018 1/6/2017, 1/23/2014, 2/8/2013, 1/13/2012    DIABETES MONOFILAMENT / LE EXAM 4/20/2018 4/20/2017 (Done)    Override on 4/20/2017: Done    IMM DTaP/Tdap/Td Vaccine (2 - Td) 2/9/2027 2/9/2017    COLONOSCOPY 4/13/2027 4/13/2017            Current Immunizations     Pneumococcal polysaccharide vaccine (PPSV-23) 2/9/2017    Tdap Vaccine 2/9/2017      Below and/or attached are the medications your provider expects you to take. Review all of your home medications and newly ordered medications with your provider and/or pharmacist. Follow medication instructions as directed by your provider and/or pharmacist. Please keep your medication list with you and share with your provider. Update the information when medications are discontinued, doses are changed, or new medications (including over-the-counter products) are added; and carry medication information at all times in the event of emergency situations     Allergies:  No Known Allergies          Medications  Valid as of: April 20, 2017 -  9:12 AM    Generic Name Brand Name Tablet Size Instructions for use    Aspirin (Chew Tab) ASA 81 MG Take 1 Tab by mouth every day.        Aspirin (Chew Tab) ASA 81 MG Take 1 Tab by mouth every day.        Atorvastatin Calcium (Tab) LIPITOR 20 MG Take 1 Tab by mouth every day.        Atorvastatin Calcium (Tab) LIPITOR 20 MG Take 1 Tab by mouth every bedtime.        GlyBURIDE-MetFORMIN (Tab) GLUCOVANCE 5-500 MG TAKE ONE TABLET BY MOUTH TWICE A DAY WITH MEALS        Lisinopril (Tab) PRINIVIL 5 MG Take 1 Tab by mouth every day.        MetFORMIN HCl (Tab) GLUCOPHAGE 500 MG Take 1 Tab by mouth 2 times a day, with meals.        Misc.  Devices (Misc) Misc. Devices  Please provide patient with shingles vaccine        Simvastatin (Tab) ZOCOR 20 MG TAKE ONE TABLET BY MOUTH EVERY EVENING        SITagliptin Phosphate (Tab) JANUVIA 100 MG Take 1 Tab by mouth every day.        SITagliptin-MetFORMIN HCl (Tab) JANUMET  MG Take 1 Tab by mouth 2 times a day, with meals.        Tadalafil (Tab) CIALIS 20 MG Take 1 Tab by mouth as needed for Erectile Dysfunction.        .                 Medicines prescribed today were sent to:     [facility] [address] [place] [address] [place], [place] - [address] AT [facility] [employer]    [address] [place] [address]    Phone: [phone] Fax: [phone]    Open 24 Hours?: No      Medication refill instructions:       If your prescription bottle indicates you have medication refills left, it is not necessary to call your provider’s office. Please contact your pharmacy and they will refill your medication.    If your prescription bottle indicates you do not have any refills left, you may request refills at any time through one of the following ways: The online [employer] system (except Urgent Care), by calling your provider’s office, or by asking your pharmacy to contact your provider’s office with a refill request. Medication refills are processed only during regular business hours and may not be available until the next business day. Your provider may request additional information or to have a follow-up visit with you prior to refilling your medication.   *Please Note: Medication refills are assigned a new Rx number when refilled electronically. Your pharmacy may indicate that no refills were authorized even though a new prescription for the same medication is available at the pharmacy. Please request the medicine by name with the pharmacy before contacting your provider for a refill.        Your To Do List     Future Labs/Procedures Complete By Expires    COMP METABOLIC PANEL  As directed 4/20/2018    HEMOGLOBIN  A1C  As directed 4/20/2018    LIPID PROFILE  As directed 4/20/2018    MICROALBUMIN CREAT RATIO URINE  As directed 4/20/2018         MyChart Status: Patient Declined

## 2017-04-20 NOTE — ASSESSMENT & PLAN NOTE
We did start patient on lisinopril 5 mg given history of diabetes. But he is not certain which medications he is taking. I asked him to bring all of his medications to clinic next month.

## 2017-05-11 ENCOUNTER — HOSPITAL ENCOUNTER (OUTPATIENT)
Dept: LAB | Facility: MEDICAL CENTER | Age: 62
End: 2017-05-11
Attending: FAMILY MEDICINE
Payer: COMMERCIAL

## 2017-05-11 DIAGNOSIS — E78.00 HYPERCHOLESTEROLEMIA: ICD-10-CM

## 2017-05-11 DIAGNOSIS — E08.00 DIABETES MELLITUS DUE TO UNDERLYING CONDITION WITH HYPEROSMOLARITY WITHOUT COMA, WITHOUT LONG-TERM CURRENT USE OF INSULIN (HCC): ICD-10-CM

## 2017-05-11 LAB
ALBUMIN SERPL BCP-MCNC: 3.7 G/DL (ref 3.2–4.9)
ALBUMIN/GLOB SERPL: 1.1 G/DL
ALP SERPL-CCNC: 67 U/L (ref 30–99)
ALT SERPL-CCNC: 12 U/L (ref 2–50)
ANION GAP SERPL CALC-SCNC: 5 MMOL/L (ref 0–11.9)
AST SERPL-CCNC: 15 U/L (ref 12–45)
BILIRUB SERPL-MCNC: 0.7 MG/DL (ref 0.1–1.5)
BUN SERPL-MCNC: 15 MG/DL (ref 8–22)
CALCIUM SERPL-MCNC: 9.4 MG/DL (ref 8.5–10.5)
CHLORIDE SERPL-SCNC: 106 MMOL/L (ref 96–112)
CHOLEST SERPL-MCNC: 107 MG/DL (ref 100–199)
CO2 SERPL-SCNC: 26 MMOL/L (ref 20–33)
CREAT SERPL-MCNC: 0.63 MG/DL (ref 0.5–1.4)
CREAT UR-MCNC: 117.5 MG/DL
EST. AVERAGE GLUCOSE BLD GHB EST-MCNC: 235 MG/DL
GFR SERPL CREATININE-BSD FRML MDRD: >60 ML/MIN/1.73 M 2
GLOBULIN SER CALC-MCNC: 3.5 G/DL (ref 1.9–3.5)
GLUCOSE SERPL-MCNC: 166 MG/DL (ref 65–99)
HBA1C MFR BLD: 9.8 % (ref 0–5.6)
HDLC SERPL-MCNC: 47 MG/DL
LDLC SERPL CALC-MCNC: 47 MG/DL
MICROALBUMIN UR-MCNC: 130.7 MG/DL
MICROALBUMIN/CREAT UR: 1112 MG/G (ref 0–30)
POTASSIUM SERPL-SCNC: 3.8 MMOL/L (ref 3.6–5.5)
PROT SERPL-MCNC: 7.2 G/DL (ref 6–8.2)
SODIUM SERPL-SCNC: 137 MMOL/L (ref 135–145)
TRIGL SERPL-MCNC: 67 MG/DL (ref 0–149)

## 2017-05-11 PROCEDURE — 80053 COMPREHEN METABOLIC PANEL: CPT

## 2017-05-11 PROCEDURE — 83036 HEMOGLOBIN GLYCOSYLATED A1C: CPT

## 2017-05-11 PROCEDURE — 80061 LIPID PANEL: CPT

## 2017-05-11 PROCEDURE — 82570 ASSAY OF URINE CREATININE: CPT

## 2017-05-11 PROCEDURE — 36415 COLL VENOUS BLD VENIPUNCTURE: CPT

## 2017-05-11 PROCEDURE — 82043 UR ALBUMIN QUANTITATIVE: CPT

## 2017-05-19 ENCOUNTER — TELEPHONE (OUTPATIENT)
Dept: MEDICAL GROUP | Age: 62
End: 2017-05-19

## 2017-05-19 NOTE — TELEPHONE ENCOUNTER
ESTABLISHED PATIENT PRE-VISIT PLANNING     Note: Patient will not be contacted if there is no indication to call.     1.  Reviewed note from last office visit with PCP and/or other med group provider: Yes    2.  If any orders were placed at last visit, do we have Results/Consult Notes?        •  Labs - Labs ordered, completed and results are in chart.       •  Imaging - Imaging was not ordered at last office visit.       •  Referrals - No referrals were ordered at last office visit.    3.  Immunizations were updated in [facility] using [employer]?: Yes       •  [employer] Recommendations: FLU HEPATITIS A  HEPATITIS B TD ZOSTAVAX (Shingles)    4.  Patient is due for the following Health Maintenance Topics:   Health Maintenance Due   Topic Date Due   • IMM ZOSTER VACCINE  12/29/2014       - Patient has completed PNEUMOVAX (PPSV23) and TDAP Immunization(s) per [employer]. Chart has been updated.    5.  Patient was not informed to arrive 15 min prior to their scheduled appointment and bring in their medication bottles.

## 2017-05-22 ENCOUNTER — OFFICE VISIT (OUTPATIENT)
Dept: MEDICAL GROUP | Age: 62
End: 2017-05-22
Payer: COMMERCIAL

## 2017-05-22 VITALS
WEIGHT: 186.4 LBS | DIASTOLIC BLOOD PRESSURE: 68 MMHG | OXYGEN SATURATION: 95 % | HEIGHT: 62 IN | SYSTOLIC BLOOD PRESSURE: 120 MMHG | HEART RATE: 88 BPM | BODY MASS INDEX: 34.3 KG/M2 | TEMPERATURE: 97.5 F

## 2017-05-22 DIAGNOSIS — E78.00 HYPERCHOLESTEREMIA: ICD-10-CM

## 2017-05-22 DIAGNOSIS — R80.9 MICROALBUMINURIA: ICD-10-CM

## 2017-05-22 DIAGNOSIS — Z23 NEED FOR VACCINATION: ICD-10-CM

## 2017-05-22 DIAGNOSIS — K42.0 UMBILICAL HERNIA WITH OBSTRUCTION, WITHOUT GANGRENE: ICD-10-CM

## 2017-05-22 DIAGNOSIS — E08.00 DIABETES MELLITUS DUE TO UNDERLYING CONDITION WITH HYPEROSMOLARITY WITHOUT COMA, WITHOUT LONG-TERM CURRENT USE OF INSULIN (HCC): ICD-10-CM

## 2017-05-22 PROCEDURE — 90471 IMMUNIZATION ADMIN: CPT | Performed by: FAMILY MEDICINE

## 2017-05-22 PROCEDURE — 90736 HZV VACCINE LIVE SUBQ: CPT | Performed by: FAMILY MEDICINE

## 2017-05-22 PROCEDURE — 99214 OFFICE O/P EST MOD 30 MIN: CPT | Mod: 25 | Performed by: FAMILY MEDICINE

## 2017-05-22 RX ORDER — SIMVASTATIN 20 MG
TABLET ORAL
Qty: 90 TAB | Refills: 2 | Status: SHIPPED | OUTPATIENT
Start: 2017-05-22

## 2017-05-22 RX ORDER — GLIMEPIRIDE 2 MG/1
2 TABLET ORAL EVERY MORNING
Qty: 60 TAB | Refills: 0 | Status: SHIPPED | OUTPATIENT
Start: 2017-05-22 | End: 2017-08-30 | Stop reason: SDUPTHER

## 2017-05-22 NOTE — PROGRESS NOTES
This medical record contains text that has been entered with the assistance of computer voice recognition and dictation software.  Therefore, it may contain unintended errors in text, spelling, punctuation, or grammar    No chief complaint on file.      Phillip Guaman is a 61 y.o. male here evaluation and management of: Diabetes mellitus, hyperlipidemia, umbilical hernia, microalbuminuria      HPI:     Hypercholesteremia  After titrating atorvastatin to 20 mg by mouth daily at bedtime. His LDL goal. He denies any muscle pain or abdominal pain no history of elevated liver enzymes     Results for PHILLIP GUAMAN (MRN [id number]) as of 5/22/2017 14:47   Ref. Range 5/11/2017 09:02   Cholesterol,Tot Latest Ref Range: 100-199 mg/dL 107   Triglycerides Latest Ref Range: 0-149 mg/dL 67   HDL Latest Ref Range: >=40 mg/dL 47   LDL Latest Ref Range: <100 mg/dL 47       Umbilical hernia  Patient referral for surgical consultation has been improved. He will call [facility] to make the appointment for surgery.    DM (diabetes mellitus)  He does come in with meds in hand to show what he is taking,  He is taking Janumet 50/1000mg po bid    He denied any polyuria, no polydipsia, no polyphagia, no weight loss, no numbness or tingling anywhere, no change in vision.    The patient is on , aspirin , a statin but he stopped his ACE, also checks feet meticulously daily for ulcerations.    Retinal screening up to date  microalbumin checked up to date  Up to date on vaccinations yes    Microalbuminuria  Patient has not been taking his ACE inhibitor.    Results for PHILLIP GUAMAN (MRN [id number]) as of 5/22/2017 14:47   Ref. Range 5/11/2017 09:03   Micro Alb Creat Ratio Latest Ref Range: 0-30 mg/g 1112 (H)   Creatinine, Urine Latest Units: mg/dL 117.50   Microalbumin, Urine Random Latest Units: mg/dL 130.7     Current medicines (including changes today)  Current Outpatient Prescriptions   Medication Sig Dispense  Refill   • simvastatin (ZOCOR) 20 MG Tab TAKE ONE TABLET BY MOUTH EVERY EVENING 90 Tab 2   • glimepiride (AMARYL) 2 MG Tab Take 1 Tab by mouth every morning. 60 Tab 0   • sitagliptan-metformin (JANUMET)  MG per tablet Take 1 Tab by mouth 2 times a day, with meals. 180 Tab 0   • atorvastatin (LIPITOR) 20 MG Tab Take 1 Tab by mouth every day. 30 Tab 0   • aspirin (ASA) 81 MG Chew Tab chewable tablet Take 1 Tab by mouth every day. 100 Tab 0   • aspirin (ASA) 81 MG Chew Tab chewable tablet Take 1 Tab by mouth every day. (Patient not taking: Reported on 5/22/2017) 300 Tab 0   • atorvastatin (LIPITOR) 20 MG Tab Take 1 Tab by mouth every bedtime. 90 Tab 0   • Misc. Devices Misc Please provide patient with shingles vaccine 1 Application 0   • metformin (GLUCOPHAGE) 500 MG Tab Take 1 Tab by mouth 2 times a day, with meals. (Patient not taking: Reported on 5/22/2017) 60 Tab 0   • lisinopril (PRINIVIL) 5 MG Tab Take 1 Tab by mouth every day. (Patient not taking: Reported on 5/22/2017) 30 Tab 0   • glyburide-metformin (GLUCOVANCE) 5-500 MG TABS TAKE ONE TABLET BY MOUTH TWICE A DAY WITH MEALS (Patient not taking: Reported on 5/22/2017) 60 Tab 0   • sitagliptin (JANUVIA) 100 MG TABS Take 1 Tab by mouth every day. (Patient not taking: Reported on 5/22/2017) 30 Tab 11   • tadalafil (CIALIS) 20 MG tablet Take 1 Tab by mouth as needed for Erectile Dysfunction. (Patient not taking: Reported on 5/22/2017) 5 Tab 6     No current facility-administered medications for this visit.     He  has no past medical history on file.  He  has no past surgical history on file.  Social History   Substance Use Topics   • Smoking status: Never Smoker    • Smokeless tobacco: Never Used   • Alcohol Use: No     Social History     Social History Narrative     History reviewed. No pertinent family history.  No family status information on file.         ROS  Please history of present illness    All other systems reviewed and are negative      "Objective:     Blood pressure 120/68, pulse 88, temperature 36.4 °C (97.5 °F), height 1.575 m (5' 2.01\"), weight 84.55 kg (186 lb 6.4 oz), SpO2 95 %. Body mass index is 34.08 kg/(m^2).  Physical Exam:    Constitutional: Alert, no distress.  Skin: Warm, dry, good turgor, no rashes in visible areas.  Eye: Equal, round and reactive, conjunctiva clear, lids normal.  ENMT: Lips without lesions, good dentition, oropharynx clear.  Neck: Trachea midline, no masses, no thyromegaly. No cervical or supraclavicular lymphadenopathy.  Respiratory: Unlabored respiratory effort, lungs clear to auscultation, no wheezes, no ronchi.  Cardiovascular: Normal S1, S2, no murmur, no edema.  Abdomen: Soft, non-tender, no masses, no hepatosplenomegaly.  Psych: Alert and oriented x3, normal affect and mood.          Assessment and Plan:   The following treatment plan was discussed, again this medical record contains text that has been entered with the assistance of computer voice recognition and dictation software.  Therefore, it may contain unintended errors in text, spelling, punctuation, or grammar      1. Hypercholesteremia  LDL at goal  Continue current management    - simvastatin (ZOCOR) 20 MG Tab; TAKE ONE TABLET BY MOUTH EVERY EVENING  Dispense: 90 Tab; Refill: 2  - glimepiride (AMARYL) 2 MG Tab; Take 1 Tab by mouth every morning.  Dispense: 60 Tab; Refill: 0    2. Need for vaccination  Given today  - VARICELLA ZOSTER VACCINE SQ    3. Umbilical hernia with obstruction, without gangrene  He will call surgery to arrange visi    4. Diabetes mellitus due to underlying condition with hyperosmolarity without coma, without long-term current use of insulin (CMS-HCC)  NOT at goal  Add glimepiride 2mg po q24h  The patient was counseled on the following  1. meticulous bilateral feet inspection daily  2. yearly dilated eye exams,   3. Wt. loss of 5% will decrease insulin resistance follow a low-fat diet and to begin an exercise program  4.  Pt. " is on baby aspirin  5. Patient  is on an ace inhibitor will check microalbumin yearly  6. BP goal is ,130/80, and LDL goal is <70  7. Hba1c goal is less than 7  8. Recommend cmp and A1C evaluation every 3 months  9. Check blood sugar twice daily  and minimum once daily at various times  10. Also needs diabetic education  11. Recommend vaccinations: Yearly Flu, Pneumvox, and hepatitis B vaccine  12. Yearly dental exam  13. Patient is on a Statin    5. Microalbuminuria  Recommended that he start his ACE  Tight glycemic control  And tight bp control      HEALTH MAINTENANCE:shingles given today    Followup: Return in about 6 weeks (around 7/3/2017) for Reevaluation.

## 2017-05-22 NOTE — ASSESSMENT & PLAN NOTE
Patient has not been taking his ACE inhibitor.    Results for PACHECO GUAMAN (MRN [id number]) as of 5/22/2017 14:47   Ref. Range 5/11/2017 09:03   Micro Alb Creat Ratio Latest Ref Range: 0-30 mg/g 1112 (H)   Creatinine, Urine Latest Units: mg/dL 117.50   Microalbumin, Urine Random Latest Units: mg/dL 130.7

## 2017-05-22 NOTE — ASSESSMENT & PLAN NOTE
After titrating atorvastatin to 20 mg by mouth daily at bedtime. His LDL goal. He denies any muscle pain or abdominal pain no history of elevated liver enzymes     Results for FÉLIX GUAMANEL (MRN [id number]) as of 5/22/2017 14:47   Ref. Range 5/11/2017 09:02   Cholesterol,Tot Latest Ref Range: 100-199 mg/dL 107   Triglycerides Latest Ref Range: 0-149 mg/dL 67   HDL Latest Ref Range: >=40 mg/dL 47   LDL Latest Ref Range: <100 mg/dL 47

## 2017-05-22 NOTE — MR AVS SNAPSHOT
"Phillip Mann   2017 3:00 PM   Office Visit   MRN: [id number]    Department:  [address] [place] [address]   Dept Phone:  [phone]    Description:  Male : 1955   Provider:  Codie Bianchi M.D.; [facility] RN           Allergies as of 2017     No Known Allergies      You were diagnosed with     Hypercholesteremia   [018166]       Need for vaccination   [569771]       Umbilical hernia with obstruction, without gangrene   [910246]       Diabetes mellitus due to underlying condition with hyperosmolarity without coma, without long-term current use of insulin (CMS-[facility])   [7465908]       Microalbuminuria   [609829]         Vital Signs     Blood Pressure Pulse Temperature Height Weight Body Mass Index    120/68 mmHg 88 36.4 °C (97.5 °F) 1.575 m (5' 2.01\") 84.55 kg (186 lb 6.4 oz) 34.08 kg/m2    Oxygen Saturation Smoking Status                95% Never Smoker           Basic Information     Date Of Birth Sex Race Ethnicity Preferred Language    1955 Male Unknown  Origin (Armenian,Burkinan,Bahamian,Blaine, etc) English      Your appointments     May 22, 2017  3:00 PM   Diabetes Care Visit with Codie Bianchi M.D., [facility] DI[facility] RN   [address] [id number] [phone]           You will be receiving a confirmation call a few days before your appointment from our automated call confirmation system.            2017  3:00 PM   Diabetes Care Visit with Codie Bianchi M.D., [facility] DI[facility] RN   [address] [id number] [phone]           You will be receiving a confirmation call a few days before your appointment from our automated call confirmation system.              Problem List              ICD-10-CM Priority Class Noted - Resolved    DM (diabetes mellitus) (CMS-[facility]) E11.9   2014 - Present    " Hypercholesterolemia E78.00   1/23/2014 - Present    Essential hypertension I10   1/5/2017 - Present    Umbilical hernia K42.9   1/5/2017 - Present    Preventative health care Z00.00   1/5/2017 - Present    Obesity (BMI 30-39.9) E66.9   1/5/2017 - Present    Need for vaccination Z23   2/9/2017 - Present    Screening for colon cancer Z12.11   2/9/2017 - Present    Hypercholesteremia E78.00   5/22/2017 - Present    Microalbuminuria R80.9   5/22/2017 - Present      Health Maintenance        Date Due Completion Dates    IMM ZOSTER VACCINE 12/29/2014 ---    A1C SCREENING 11/11/2017 5/11/2017, 1/6/2017, 1/23/2014, 2/8/2013, 4/6/2012    DIABETES MONOFILAMENT / LE EXAM 4/20/2018 4/20/2017 (Done)    Override on 4/20/2017: Done    RETINAL SCREENING 5/10/2018 5/10/2017, 5/3/2016    FASTING LIPID PROFILE 5/11/2018 5/11/2017, 1/6/2017, 1/23/2014, 2/8/2013, 4/6/2012, 1/13/2012    URINE ACR / MICROALBUMIN 5/11/2018 5/11/2017, 1/23/2014, 2/8/2013, 1/13/2012    SERUM CREATININE 5/11/2018 5/11/2017, 1/6/2017, 1/23/2014, 2/8/2013, 1/13/2012    COLONOSCOPY 4/13/2022 4/13/2017    IMM DTaP/Tdap/Td Vaccine (2 - Td) 2/9/2027 2/9/2017            Current Immunizations     Pneumococcal polysaccharide vaccine (PPSV-23) 2/9/2017    SHINGLES VACCINE  Incomplete    Tdap Vaccine 2/9/2017      Below and/or attached are the medications your provider expects you to take. Review all of your home medications and newly ordered medications with your provider and/or pharmacist. Follow medication instructions as directed by your provider and/or pharmacist. Please keep your medication list with you and share with your provider. Update the information when medications are discontinued, doses are changed, or new medications (including over-the-counter products) are added; and carry medication information at all times in the event of emergency situations     Allergies:  No Known Allergies          Medications  Valid as of: May 22, 2017 -  2:58 PM    Generic  Name Brand Name Tablet Size Instructions for use    Aspirin (Chew Tab) ASA 81 MG Take 1 Tab by mouth every day.        Aspirin (Chew Tab) ASA 81 MG Take 1 Tab by mouth every day.        Atorvastatin Calcium (Tab) LIPITOR 20 MG Take 1 Tab by mouth every day.        Atorvastatin Calcium (Tab) LIPITOR 20 MG Take 1 Tab by mouth every bedtime.        Glimepiride (Tab) AMARYL 2 MG Take 1 Tab by mouth every morning.        GlyBURIDE-MetFORMIN (Tab) GLUCOVANCE 5-500 MG TAKE ONE TABLET BY MOUTH TWICE A DAY WITH MEALS        Lisinopril (Tab) PRINIVIL 5 MG Take 1 Tab by mouth every day.        MetFORMIN HCl (Tab) GLUCOPHAGE 500 MG Take 1 Tab by mouth 2 times a day, with meals.        Misc. Devices (Misc) Misc. Devices  Please provide patient with shingles vaccine        Simvastatin (Tab) ZOCOR 20 MG TAKE ONE TABLET BY MOUTH EVERY EVENING        SITagliptin Phosphate (Tab) JANUVIA 100 MG Take 1 Tab by mouth every day.        SITagliptin-MetFORMIN HCl (Tab) JANUMET  MG Take 1 Tab by mouth 2 times a day, with meals.        Tadalafil (Tab) CIALIS 20 MG Take 1 Tab by mouth as needed for Erectile Dysfunction.        .                 Medicines prescribed today were sent to:     [id number] - [place] - [address] [place] [address]    Phone: [phone] Fax: [phone]    Open 24 Hours?: No      Medication refill instructions:       If your prescription bottle indicates you have medication refills left, it is not necessary to call your provider’s office. Please contact your pharmacy and they will refill your medication.    If your prescription bottle indicates you do not have any refills left, you may request refills at any time through one of the following ways: The online [employer] system (except Urgent Care), by calling your provider’s office, or by asking your pharmacy to contact your provider’s office with a refill request. Medication refills are processed only during regular business hours  and may not be available until the next business day. Your provider may request additional information or to have a follow-up visit with you prior to refilling your medication.   *Please Note: Medication refills are assigned a new Rx number when refilled electronically. Your pharmacy may indicate that no refills were authorized even though a new prescription for the same medication is available at the pharmacy. Please request the medicine by name with the pharmacy before contacting your provider for a refill.           MyChart Status: Patient Declined

## 2017-05-22 NOTE — ASSESSMENT & PLAN NOTE
He does come in with meds in hand to show what he is taking,  He is taking Janumet 50/1000mg po bid    He denied any polyuria, no polydipsia, no polyphagia, no weight loss, no numbness or tingling anywhere, no change in vision.    The patient is on , aspirin , a statin but he stopped his ACE, also checks feet meticulously daily for ulcerations.    Retinal screening up to date  microalbumin checked up to date  Up to date on vaccinations yes

## 2017-05-22 NOTE — ASSESSMENT & PLAN NOTE
Patient referral for surgical consultation has been improved. He will call [facility] to make the appointment for surgery.

## 2017-06-15 DIAGNOSIS — E08.21 DIABETES MELLITUS DUE TO UNDERLYING CONDITION WITH DIABETIC NEPHROPATHY, WITHOUT LONG-TERM CURRENT USE OF INSULIN (HCC): ICD-10-CM

## 2017-06-22 ENCOUNTER — HOSPITAL ENCOUNTER (OUTPATIENT)
Dept: LAB | Facility: MEDICAL CENTER | Age: 62
End: 2017-06-22
Attending: SURGERY
Payer: COMMERCIAL

## 2017-06-22 LAB
BUN SERPL-MCNC: 14 MG/DL (ref 8–22)
CREAT SERPL-MCNC: 0.6 MG/DL (ref 0.5–1.4)
GFR SERPL CREATININE-BSD FRML MDRD: >60 ML/MIN/1.73 M 2

## 2017-06-22 PROCEDURE — 36415 COLL VENOUS BLD VENIPUNCTURE: CPT

## 2017-06-22 PROCEDURE — 84520 ASSAY OF UREA NITROGEN: CPT

## 2017-06-22 PROCEDURE — 82565 ASSAY OF CREATININE: CPT

## 2017-06-29 ENCOUNTER — HOSPITAL ENCOUNTER (OUTPATIENT)
Dept: RADIOLOGY | Facility: MEDICAL CENTER | Age: 62
End: 2017-06-29
Attending: SURGERY
Payer: COMMERCIAL

## 2017-06-29 DIAGNOSIS — K42.9 RECURRENT UMBILICAL HERNIA: ICD-10-CM

## 2017-06-29 PROCEDURE — 74177 CT ABD & PELVIS W/CONTRAST: CPT

## 2017-06-29 PROCEDURE — 700117 HCHG RX CONTRAST REV CODE 255: Performed by: SURGERY

## 2017-06-29 RX ADMIN — IOHEXOL 100 ML: 350 INJECTION, SOLUTION INTRAVENOUS at 11:21

## 2017-07-03 ENCOUNTER — APPOINTMENT (OUTPATIENT)
Dept: MEDICAL GROUP | Age: 62
End: 2017-07-03
Payer: COMMERCIAL

## 2017-08-30 DIAGNOSIS — E78.00 HYPERCHOLESTEREMIA: ICD-10-CM

## 2017-08-30 RX ORDER — GLIMEPIRIDE 2 MG/1
TABLET ORAL
Qty: 60 TAB | Refills: 0 | Status: SHIPPED | OUTPATIENT
Start: 2017-08-30

## 2018-08-08 ENCOUNTER — PATIENT OUTREACH (OUTPATIENT)
Dept: HEALTH INFORMATION MANAGEMENT | Facility: OTHER | Age: 63
End: 2018-08-08

## 2018-08-08 NOTE — PROGRESS NOTES
Outcome: Left Message    Please transfer to Patient Outreach Team at [phone] when patient returns call.    WebIZ Checked & Epic Updated:  yes    [facility] Verified: yes    Attempt # 1

## 2018-09-10 NOTE — PROGRESS NOTES
Outcome: Number on file is pt daughter, she would like to discuss available days for pt to come in for DM visit and will call us back. [phone] given    Please transfer to Patient Outreach Team at [phone] when patient returns call.    Attempt # 2

## 2020-12-10 NOTE — ASSESSMENT & PLAN NOTE
We started him on lisinopril 5 mg by mouth daily almost visit. Blood pressure is better controlled based on vitals today.The patient is  on a Baby ASPIRIN and a  STATIN.  The patient has been tollerating the BP meds without any issues.He is tolerating his medication well, he denies any lightheadedness, no tunnel vision, no syncopal episodes, no fatigue, no leg weakness no falls.   foreign body throat

## 2021-03-03 DIAGNOSIS — Z23 NEED FOR VACCINATION: ICD-10-CM

## 2024-02-21 NOTE — PROGRESS NOTES
Ordered endoscopy but she has to do both colonoscopy and endoscopy to check for colitis and gluten sensitivty as a work up of her digestive issues    This medical record contains text that has been entered with the assistance of computer voice recognition and dictation software.  Therefore, it may contain unintended errors in text, spelling, punctuation, or grammar    Chief Complaint   Patient presents with   • Establish Care   • Hernia     possible hernia in the umbilical area that he would like to have checked.        Phillip Lamas is a 61 y.o. male here evaluation and management of: establish care, hernia      HPI:     Hypercholesterolemia  [facility]ws Cholesterol   The patient stopped taking his simvastatin as well. Again this is all because his doctor stopped working hearing and return to the clinic.    Preventative health care  The patient is a 61-year-old male who presents to clinic to reestablish care. He has a significant past medical history of diabetes mellitus, hypertension and hyperlipidemia. He stopped taking all of his medications since his doctor stopped working here couple years ago. The patient denied any chest pain, no sob, no hartley, no  pnd, no orthopnea, no headache, no changes in vision, no numbness or tingling, no nausea, no diarrhea, no abdominal pain, no fevers, no chills, no bright red blood per rectum, no  difficulty urinating, no burning during micturition, no depressed mood, no other concerns.        DM (diabetes mellitus)  The patient has a significant history of diabetes mellitus he was taking Januvia 100 as well as glucovance 5/500 bid and tollerated them fine in the past. The patient denies  any polydipsia, polyuria, polyphagia, weight loss, no numbness or tingling anywhere, no changes in vision.    Results for PHILLIP TORRES (MRN [id number]) as of 1/5/2017 13:37   Ref. Range 2/8/2013 09:50 1/23/2014 11:16   Glycohemoglobin Latest Ref Range: 0.0-5.6 % 11.4 9.3 (H)   Estim. Avg Glu Latest Units: mg/dL 280 220       Umbilical hernia  Patient states that this has been present for years but recently has become painfull. He works as  "a  and has lift heavy equipment in the kitchen. He denies any changes in bowel or bladder function. He is able tolerate by mouth.      Current medicines (including changes today)  Current Outpatient Prescriptions   Medication Sig Dispense Refill   • metformin (GLUCOPHAGE) 500 MG Tab Take 1 Tab by mouth 2 times a day, with meals. 60 Tab 0   • lisinopril (PRINIVIL) 5 MG Tab Take 1 Tab by mouth every day. 30 Tab 0   • atorvastatin (LIPITOR) 20 MG Tab Take 1 Tab by mouth every day. 30 Tab 0   • aspirin (ASA) 81 MG Chew Tab chewable tablet Take 1 Tab by mouth every day. 100 Tab 0   • simvastatin (ZOCOR) 20 MG TABS TAKE ONE TABLET BY MOUTH EVERY EVENING (Patient not taking: Reported on 1/5/2017) 30 Tab 0   • glyburide-metformin (GLUCOVANCE) 5-500 MG TABS TAKE ONE TABLET BY MOUTH TWICE A DAY WITH MEALS (Patient not taking: Reported on 1/5/2017) 60 Tab 0   • sitagliptin (JANUVIA) 100 MG TABS Take 1 Tab by mouth every day. (Patient not taking: Reported on 1/5/2017) 30 Tab 11   • tadalafil (CIALIS) 20 MG tablet Take 1 Tab by mouth as needed for Erectile Dysfunction. (Patient not taking: Reported on 1/5/2017) 5 Tab 6     No current facility-administered medications for this visit.     He  has no past medical history on file.  He  has no past surgical history on file.  Social History   Substance Use Topics   • Smoking status: Never Smoker    • Smokeless tobacco: Never Used   • Alcohol Use: No     Social History     Social History Narrative     History reviewed. No pertinent family history.  No family status information on file.         ROS  Please see hpi    All other systems reviewed and are negative     Objective:     Blood pressure 140/80, pulse 82, temperature 36.1 °C (97 °F), resp. rate 12, height 1.575 m (5' 2.01\"), weight 80.74 kg (178 lb), SpO2 95 %. Body mass index is 32.55 kg/(m^2).  Physical Exam:    Constitutional: Alert, no distress.  Skin: Warm, dry, good turgor, no rashes in visible areas.  Eye: Equal, round " and reactive, conjunctiva clear, lids normal.  ENMT: Lips without lesions, good dentition, oropharynx clear.  Neck: Trachea midline, no masses, no thyromegaly. No cervical or supraclavicular lymphadenopathy.  Respiratory: Unlabored respiratory effort, lungs clear to auscultation, no wheezes, no ronchi.  Cardiovascular: Normal S1, S2, no murmur, no edema.  Abdomen: Soft,  Ventral hernia noted below umbilicus 2cm, TTT, easily reduced  Psych: Alert and oriented x3, normal affect and mood.          Assessment and Plan:   The following treatment plan was discussed, again this medical record contains text that has been entered with the assistance of computer voice recognition and dictation software.  Therefore, it may contain unintended errors in text, spelling, punctuation, or grammar      1. Diabetes mellitus due to underlying condition with hyperosmolarity without coma, without long-term current use of insulin (HCC)    Start metformin today 500 mg twice a day  Expected changes to Janumet on next visit  Begin lisinopril  Begin aspirin  Begin Lipitor  Also need labs  He is up-to-date on screening    - metformin (GLUCOPHAGE) 500 MG Tab; Take 1 Tab by mouth 2 times a day, with meals.  Dispense: 60 Tab; Refill: 0  - lisinopril (PRINIVIL) 5 MG Tab; Take 1 Tab by mouth every day.  Dispense: 30 Tab; Refill: 0  - LIPID PROFILE; Future  - HEMOGLOBIN A1C; Future  - COMP METABOLIC PANEL; Future  - CBC WITH DIFFERENTIAL; Future  - POCT Hemoglobin    2. Hypercholesterolemia  Start Lipitor  Obtain new labs  - atorvastatin (LIPITOR) 20 MG Tab; Take 1 Tab by mouth every day.  Dispense: 30 Tab; Refill: 0    3. Essential hypertension  Begin ACE and aspirin    - aspirin (ASA) 81 MG Chew Tab chewable tablet; Take 1 Tab by mouth every day.  Dispense: 100 Tab; Refill: 0    4. Umbilical hernia without obstruction and without gangrene  Since it is causing pain  Will ask for surgical help    - REFERRAL TO GENERAL SURGERY    5. Preventative  health care  Care has been established        Followup: Return in about 4 weeks (around 2/2/2017) for Reevaluation, Medication refill.